# Patient Record
(demographics unavailable — no encounter records)

---

## 2025-07-17 NOTE — HEALTH RISK ASSESSMENT
[Good] : ~his/her~  mood as  good [Yes] : Yes [Monthly or less (1 pt)] : Monthly or less (1 point) [1 or 2 (0 pts)] : 1 or 2 (0 points) [Never (0 pts)] : Never (0 points) [No] : In the past 12 months have you used drugs other than those required for medical reasons? No [No falls in past year] : Patient reported no falls in the past year [0] : 2) Feeling down, depressed, or hopeless: Not at all (0) [PHQ-2 Negative - No further assessment needed] : PHQ-2 Negative - No further assessment needed [Audit-CScore] : 1 [[id number]] : 0 [Never] : Never

## 2025-07-17 NOTE — HISTORY OF PRESENT ILLNESS
[FreeTextEntry1] : CPE. [de-identified] : New patient, 27-year female presenting for a CPE. Patient is complaining of several year history of foot and ankle pain when she sits of stands for a long time. She also complains of epigastric pain, intermittent, related to feeling that stomach is full of gas, occasional reflux, ongoing for more than 5 years. She stopped eating meat due to increase intraabdominal gas.

## 2025-07-26 NOTE — ASSESSMENT
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 increased DP talocalcaneal angle with mild talar head uncovering.  Demonstrated proper stretching techniques with verbal understanding  Discussed all non-surgical treatment options Recommended supportive and accommodative shoes with arch support  Recommended over-the-counter inserts to support the decreased arch  Consider custom foot orthotics in the future or advanced imaging if focal point of tenderness or symptoms worsen Patient will return to the office PRN

## 2025-07-26 NOTE — HISTORY OF PRESENT ILLNESS
[Sneakers] : fam [FreeTextEntry1] : MARTÍNEZ is a 27-year-old F present in the [facility] office for b/l foot pain. Patient states the pain started years ago. Patient states she is having pain on both her arch and ankle. Patient states the pain is equally the same on both feet. Patient pain scale 1 out of 10 is an 8. Patient states she sprain her left ankle last year last July. Patient denies any injuries to her right foot. Patient denies sharp throbbing pain it more soreness. Patient states she feels the pain more when she is standing for a long time.

## 2025-07-26 NOTE — PHYSICAL EXAM
[General Appearance - Alert] : alert [General Appearance - In No Acute Distress] : in no acute distress [Ankle Swelling (On Exam)] : not present [Delayed in the Right Toes] : capillary refills normal in right toes [Delayed in the Left Toes] : capillary refills normal in the left toes [de-identified] : No appreciable pain on palpation at medial/plantar calcaneal tubercle, medial malleolus, along the posterior tibial tendon, and at the navicular tuberosity Right/Left.  Forefoot varus noted Right/Left.  Pain with forced eversion when foot placed in plantar and full inversion position Right/Left.  Flexible deformity noted foot passively corrected to plantigrade foot position Right/Left.   Standing Exam: mild decrease in the medial longitudinal arch Right/Left.  Hindfoot valgus noted Right/Left.  Forefoot abduction 'too many toes' sign Right/Left  [] : no rash [Skin Lesions] : no skin lesions [Sensation] : the sensory exam was normal to light touch and pinprick [No Focal Deficits] : no focal deficits [Deep Tendon Reflexes (DTR)] : deep tendon reflexes were 2+ and symmetric [Motor Exam] : the motor exam was normal [Oriented To Time, Place, And Person] : oriented to person, place, and time [Impaired Insight] : insight and judgment were intact [Affect] : the affect was normal